# Patient Record
Sex: FEMALE | Employment: OTHER | URBAN - METROPOLITAN AREA
[De-identification: names, ages, dates, MRNs, and addresses within clinical notes are randomized per-mention and may not be internally consistent; named-entity substitution may affect disease eponyms.]

---

## 2018-02-13 ENCOUNTER — OFFICE VISIT (OUTPATIENT)
Dept: INTERNAL MEDICINE CLINIC | Age: 28
End: 2018-02-13

## 2018-02-13 VITALS
HEART RATE: 66 BPM | BODY MASS INDEX: 23.99 KG/M2 | OXYGEN SATURATION: 100 % | HEIGHT: 59 IN | TEMPERATURE: 98.2 F | DIASTOLIC BLOOD PRESSURE: 60 MMHG | RESPIRATION RATE: 18 BRPM | WEIGHT: 119 LBS | SYSTOLIC BLOOD PRESSURE: 95 MMHG

## 2018-02-13 DIAGNOSIS — Z00.00 ROUTINE GENERAL MEDICAL EXAMINATION AT A HEALTH CARE FACILITY: Primary | ICD-10-CM

## 2018-02-13 RX ORDER — DROSPIRENONE AND ETHINYL ESTRADIOL TABLETS 0.02-3(28)
KIT ORAL
Refills: 4 | COMMUNITY
Start: 2017-12-08

## 2018-02-13 NOTE — PATIENT INSTRUCTIONS
[facility]    [phone]        May try OTC zyrtec or allegra or claritin as well as nasal spray - flonase, nasacort

## 2018-02-13 NOTE — PROGRESS NOTES
HISTORY OF PRESENT ILLNESS  Katharina Pineda is a 32 y.o. female. HPI Ms. Frank Rosenberg is here for a general physical exam. She sees gynecology and gets annual exams. Her and her  are considering starting a family next year. She has been bothered by nasal congestion as well as non-specific itching on her chest and arms without a rash. She does not know specific exposures that would be causing the itching. She denies any mouth or throat irritation, swelling. Suggested it may benefit her to see an allergist. I provided her the # to asthma and allergy specialists. Review of Systems   Constitutional: Negative. Respiratory: Negative for cough and shortness of breath. Cardiovascular: Negative for chest pain and leg swelling. Gastrointestinal: Negative. Skin: Positive for itching (since October, occurs once weekly, usually starts on chest and goes to arms, not associated with a rash). Negative for rash. Physical Exam   Constitutional: She is oriented to person, place, and time. She appears well-developed and well-nourished. No distress. HENT:   Head: Normocephalic and atraumatic. Eyes: Conjunctivae are normal.   Neck: No thyromegaly present. Cardiovascular: Normal rate and regular rhythm. No murmur heard. Pulmonary/Chest: Effort normal and breath sounds normal. She has no wheezes. Musculoskeletal: She exhibits no edema. Neurological: She is alert and oriented to person, place, and time. Skin: Skin is warm and dry. No rash noted. Psychiatric: She has a normal mood and affect. Her behavior is normal. Judgment and thought content normal.     Visit Vitals    BP 95/60 (BP 1 Location: Left arm, BP Patient Position: Sitting)    Pulse 66    Temp 98.2 °F (36.8 °C) (Oral)    Resp 18    Ht 4' 11\" (1.499 m)    Wt 119 lb (54 kg)    SpO2 100%    BMI 24.04 kg/m2           ASSESSMENT and PLAN    ICD-10-CM ICD-9-CM    1.  Routine general medical examination at a health care facility Z00.00 V70.0       COLLECTION VENOUS BLOOD,VENIPUNCTURE      METABOLIC PANEL, COMPREHENSIVE      LIPID PANEL      CBC WITH AUTOMATED DIFF      TSH 3RD GENERATION     Pt verbalized understanding of their condition and diagnoses, treatment plan,  as well as side effects of any new medications prescribed.   Patient Instructions   [facility]    [phone]        May try OTC zyrtec or allegra or claritin as well as nasal spray - flonase, nasacort

## 2018-02-13 NOTE — MR AVS SNAPSHOT
[address] [facility] [place] [address] [address] [address] 
[phone] Patient: Domonique Enriquez MRN: [id number] ZQU:[birth year] Visit Information Date & Time Provider Department Dept. Phone Encounter #  
 2/13/2018  8:30 AM Berna Patel PA-C [facility] [phone] [id number] Follow-up Instructions Return in about 1 year (around 2/13/2019). Upcoming Health Maintenance Date Due DTaP/Tdap/Td series (1 - Tdap) 3/1/2011 PAP AKA CERVICAL CYTOLOGY 3/1/2011 Influenza Age 5 to Adult 8/1/2017 Allergies as of 2/13/2018  Review Complete On: 2/13/2018 By: Berna Patel PA-C No Known Allergies Current Immunizations  Never Reviewed No immunizations on file. Not reviewed this visit You Were Diagnosed With   
  
 Codes Comments Routine general medical examination at a health care facility    -  Primary ICD-10-CM: Z00.00 ICD-9-CM: V70.0 Vitals BP Pulse Temp Resp Height(growth percentile) Weight(growth percentile) 95/60 (BP 1 Location: Left arm, BP Patient Position: Sitting) 66 98.2 °F (36.8 °C) (Oral) 18 4' 11\" (1.499 m) 119 lb (54 kg) LMP SpO2 BMI OB Status Smoking Status 02/11/2018 100% 24.04 kg/m2 Having regular periods Never Smoker Vitals History BMI and BSA Data Body Mass Index Body Surface Area 24.04 kg/m 2 1.5 m 2 Preferred Pharmacy Pharmacy Name Phone [facility] [address] [place] [address] [place] [facility] [phone] Your Updated Medication List  
  
   
This list is accurate as of: 2/13/18  9:05 AM.  Always use your most recent med list.  
  
  
  
  
 Nahed Simpson (28) 3-0.02 mg Tab Generic drug:  drospirenone-ethinyl estradiol TAKE 1 TABLET BY MOUTH ONCE A DAY We Performed the Following COLLECTION VENOUS BLOOD,VENIPUNCTURE [id number] CPT(R)] Follow-up Instructions Return in about 1 year (around 2/13/2019). To-Do List   
 02/13/2018 Lab:  CBC WITH AUTOMATED DIFF   
  
 02/13/2018 Lab:  LIPID PANEL   
  
 02/13/2018 Lab:  METABOLIC PANEL, COMPREHENSIVE   
  
 02/13/2018 Lab:  TSH 3RD GENERATION Patient Instructions [facility] [phone] May try OTC zyrtec or allegra or claritin as well as nasal spray - flonase, nasacort Introducing [facility]! Narciso Kat introduces [employer] patient portal. Now you can access parts of your medical record, email your doctor's office, and request medication refills online. 1. In your internet browser, go to [email or web address] 2. Click on the First Time User? Click Here link in the Sign In box. You will see the New Member Sign Up page. 3. Enter your [employer] Access Code exactly as it appears below. You will not need to use this code after youve completed the sign-up process. If you do not sign up before the expiration date, you must request a new code. · [employer] Access Code: [email or web address] Expires: 5/14/2018  9:05 AM 
 
4. Enter the last four digits of your Social Security Number (xxxx) and Date of Birth (mm/dd/yyyy) as indicated and click Submit. You will be taken to the next sign-up page. 5. Create a [employer] ID. This will be your [employer] login ID and cannot be changed, so think of one that is secure and easy to remember. 6. Create a [employer] password. You can change your password at any time. 7. Enter your Password Reset Question and Answer. This can be used at a later time if you forget your password. 8. Enter your e-mail address. You will receive e-mail notification when new information is available in [address] 9. Click Sign Up. You can now view and download portions of your medical record. 10. Click the Download Summary menu link to download a portable copy of your medical information. If you have questions, please visit the Frequently Asked Questions section of the [employer] website. Remember, [email or web address] is NOT to be used for urgent needs. For medical emergencies, dial 911. Now available from your iPhone and Android! Please provide this summary of care documentation to your next provider. If you have any questions after today's visit, please call [phone].

## 2018-02-14 LAB
ALBUMIN SERPL-MCNC: 3.9 G/DL (ref 3.5–5.5)
ALBUMIN/GLOB SERPL: 1.7 {RATIO} (ref 1.2–2.2)
ALP SERPL-CCNC: 51 IU/L (ref 39–117)
ALT SERPL-CCNC: 19 IU/L (ref 0–32)
AST SERPL-CCNC: 16 IU/L (ref 0–40)
BASOPHILS # BLD AUTO: 0 X10E3/UL (ref 0–0.2)
BASOPHILS NFR BLD AUTO: 1 %
BILIRUB SERPL-MCNC: 0.2 MG/DL (ref 0–1.2)
BUN SERPL-MCNC: 11 MG/DL (ref 6–20)
BUN/CREAT SERPL: 19 (ref 9–23)
CALCIUM SERPL-MCNC: 8.7 MG/DL (ref 8.7–10.2)
CHLORIDE SERPL-SCNC: 103 MMOL/L (ref 96–106)
CHOLEST SERPL-MCNC: 177 MG/DL (ref 100–199)
CO2 SERPL-SCNC: 24 MMOL/L (ref 18–29)
CREAT SERPL-MCNC: 0.57 MG/DL (ref 0.57–1)
EOSINOPHIL # BLD AUTO: 0.4 X10E3/UL (ref 0–0.4)
EOSINOPHIL NFR BLD AUTO: 7 %
ERYTHROCYTE [DISTWIDTH] IN BLOOD BY AUTOMATED COUNT: 13.7 % (ref 12.3–15.4)
GFR SERPLBLD CREATININE-BSD FMLA CKD-EPI: 127 ML/MIN/1.73
GFR SERPLBLD CREATININE-BSD FMLA CKD-EPI: 147 ML/MIN/1.73
GLOBULIN SER CALC-MCNC: 2.3 G/DL (ref 1.5–4.5)
GLUCOSE SERPL-MCNC: 84 MG/DL (ref 65–99)
HCT VFR BLD AUTO: 38.1 % (ref 34–46.6)
HDLC SERPL-MCNC: 71 MG/DL
HGB BLD-MCNC: 12.7 G/DL (ref 11.1–15.9)
IMM GRANULOCYTES # BLD: 0 X10E3/UL (ref 0–0.1)
IMM GRANULOCYTES NFR BLD: 0 %
LDLC SERPL CALC-MCNC: 90 MG/DL (ref 0–99)
LYMPHOCYTES # BLD AUTO: 2.2 X10E3/UL (ref 0.7–3.1)
LYMPHOCYTES NFR BLD AUTO: 38 %
MCH RBC QN AUTO: 30.2 PG (ref 26.6–33)
MCHC RBC AUTO-ENTMCNC: 33.3 G/DL (ref 31.5–35.7)
MCV RBC AUTO: 91 FL (ref 79–97)
MONOCYTES # BLD AUTO: 0.4 X10E3/UL (ref 0.1–0.9)
MONOCYTES NFR BLD AUTO: 6 %
NEUTROPHILS # BLD AUTO: 2.9 X10E3/UL (ref 1.4–7)
NEUTROPHILS NFR BLD AUTO: 48 %
PLATELET # BLD AUTO: 339 X10E3/UL (ref 150–379)
POTASSIUM SERPL-SCNC: 4.4 MMOL/L (ref 3.5–5.2)
PROT SERPL-MCNC: 6.2 G/DL (ref 6–8.5)
RBC # BLD AUTO: 4.21 X10E6/UL (ref 3.77–5.28)
SODIUM SERPL-SCNC: 142 MMOL/L (ref 134–144)
TRIGL SERPL-MCNC: 80 MG/DL (ref 0–149)
TSH SERPL DL<=0.005 MIU/L-ACNC: 1.09 UIU/ML (ref 0.45–4.5)
VLDLC SERPL CALC-MCNC: 16 MG/DL (ref 5–40)
WBC # BLD AUTO: 5.9 X10E3/UL (ref 3.4–10.8)

## 2018-04-06 ENCOUNTER — HOSPITAL ENCOUNTER (OUTPATIENT)
Dept: LAB | Age: 28
Discharge: HOME | End: 2018-04-06
Payer: MEDICARE

## 2018-04-06 PROCEDURE — 88175 CYTOPATH C/V AUTO FLUID REDO: CPT | Performed by: OBSTETRICS & GYNECOLOGY

## 2018-04-20 ENCOUNTER — HOSPITAL ENCOUNTER (OUTPATIENT)
Dept: ULTRASOUND IMAGING | Age: 28
Discharge: HOME | End: 2018-04-20
Attending: OBSTETRICS & GYNECOLOGY
Payer: MEDICARE

## 2018-04-20 ENCOUNTER — HOSPITAL ENCOUNTER (OUTPATIENT)
Dept: MAMMOGRAPHY | Age: 28
Discharge: HOME | End: 2018-04-20
Attending: OBSTETRICS & GYNECOLOGY
Payer: MEDICARE

## 2018-04-20 DIAGNOSIS — N63.0 BREAST LUMP: ICD-10-CM

## 2018-04-20 DIAGNOSIS — N63.0 BREAST NODULE: ICD-10-CM

## 2018-04-20 PROCEDURE — 77065 DX MAMMO INCL CAD UNI: CPT

## 2018-04-20 PROCEDURE — 76642 ULTRASOUND BREAST LIMITED: CPT

## 2018-09-10 ENCOUNTER — OFFICE VISIT (OUTPATIENT)
Dept: INTERNAL MEDICINE CLINIC | Age: 28
End: 2018-09-10

## 2018-09-10 VITALS
SYSTOLIC BLOOD PRESSURE: 88 MMHG | BODY MASS INDEX: 24.6 KG/M2 | HEART RATE: 66 BPM | DIASTOLIC BLOOD PRESSURE: 52 MMHG | OXYGEN SATURATION: 99 % | TEMPERATURE: 98.7 F | HEIGHT: 59 IN | RESPIRATION RATE: 18 BRPM | WEIGHT: 122 LBS

## 2018-09-10 DIAGNOSIS — G89.29 CHRONIC BILATERAL LOW BACK PAIN WITHOUT SCIATICA: Primary | ICD-10-CM

## 2018-09-10 DIAGNOSIS — M54.50 CHRONIC BILATERAL LOW BACK PAIN WITHOUT SCIATICA: Primary | ICD-10-CM

## 2018-09-10 RX ORDER — SPIRONOLACTONE 50 MG/1
TABLET, FILM COATED ORAL
Refills: 3 | COMMUNITY
Start: 2018-06-18

## 2018-09-10 NOTE — PROGRESS NOTES
Chief Complaint   Patient presents with    Back Pain     x 5 months ago lower mid back, pt denies any injury      1. Have you been to the ER, urgent care clinic since your last visit? Hospitalized since your last visit? No    2. Have you seen or consulted any other health care providers outside of the [address] [place] [address] since your last visit? Include any pap smears or colon screening.  No

## 2018-09-10 NOTE — PROGRESS NOTES
HISTORY OF PRESENT ILLNESS  Mamta Dale is a 29 y.o. female. HPI Ms. Gertrude Aguila is here for c/o 5-6 months of low back pain. She denies any injury. She denies h/o overuse, sports or accidents. She works as an  out of her house and is standing and bent over a lot. She takes ibuprofen ('s prescription strength) when they pain is more severe and it does subside the pain somewhat, but does not provide total relief. She states the pain used to be intermittent, but now is daily. She states there is always some level of pain, maybe being at a 1-2, but after working, she reports the pain can range from a 6-8. She denies any radiculopathy or leg weakness. Review of Systems   Constitutional: Negative. Respiratory: Negative. Cardiovascular: Negative. Genitourinary: Negative. Musculoskeletal: Positive for back pain. Neurological: Negative for tingling. Physical Exam   Constitutional: She is oriented to person, place, and time. She appears well-developed and well-nourished. No distress. HENT:   Head: Normocephalic and atraumatic. Musculoskeletal:        Lumbar back: She exhibits tenderness and pain. She exhibits normal range of motion. Back:    Neurological: She is alert and oriented to person, place, and time. Psychiatric: She has a normal mood and affect. Her behavior is normal. Judgment and thought content normal.     Visit Vitals    BP (!) 88/52 (BP 1 Location: Left arm, BP Patient Position: Sitting)    Pulse 66    Temp 98.7 °F (37.1 °C) (Oral)    Resp 18    Ht 4' 11\" (1.499 m)    Wt 122 lb (55.3 kg)    SpO2 99%    BMI 24.64 kg/m2   we discussed continuing to take motrin as needed. Discussed muscle relaxer for night time, but she did not want additional medication at this time. She is interested in PT. Will contact pt after final radiology report is available. Diagnoses and all orders for this visit:    1.  Chronic bilateral low back pain without sciatica  -     XR SPINE LUMB 2 OR 3 V; Future  -     REFERRAL TO PHYSICAL THERAPY    Pt verbalized understanding of their condition and diagnoses, treatment plan,  as well as side effects of any new medications prescribed.

## 2018-09-12 ENCOUNTER — TELEPHONE (OUTPATIENT)
Dept: INTERNAL MEDICINE CLINIC | Age: 28
End: 2018-09-12

## 2018-09-26 ENCOUNTER — HOSPITAL ENCOUNTER (OUTPATIENT)
Dept: PHYSICAL THERAPY | Age: 28
Discharge: HOME | End: 2018-09-26
Payer: COMMERCIAL

## 2018-09-26 PROCEDURE — 97530 THERAPEUTIC ACTIVITIES: CPT | Performed by: PHYSICAL THERAPIST

## 2018-09-26 PROCEDURE — 97161 PT EVAL LOW COMPLEX 20 MIN: CPT | Performed by: PHYSICAL THERAPIST

## 2018-09-26 NOTE — PROGRESS NOTES
PHYSICAL THERAPY - DAILY TREATMENT NOTE Patient Name: Sveta Ronnie        Date: 2018 : 1990   YES Patient  Verified Visit #:     Insurance: Payor: Robert Johnson / Plan: [facility] [employer] MEDICARE CHOICE PPO/PFFS / Product Type: Managed Care Medicare / In time: 3:15 Out time: 4:05 Total Treatment Time: 50 Medicare Time Tracking (below) Total Timed Codes (min):  na 1:1 Treatment Time:  na  
 
TREATMENT AREA = Lower back pain [M54.5] SUBJECTIVE Pain Level (on 0 to 10 scale):  1  / 10 Medication Changes/New allergies or changes in medical history, any new surgeries or procedures? NO    If yes, update Summary List  
Subjective Functional Status/Changes:  []  No changes reported See eval/POC OBJECTIVE Modalities Rationale:   PD 
 min [] Estim, type/location:    
                                 []  att     []  unatt     []  w/US     []  w/ice    []  w/heat  
 min []  Mechanical Traction: type/lbs   
               []  pro   []  sup   []  int   []  cont    []  before manual    []  after manual  
 min []  Ultrasound, settings/location:    
 min []  Iontophoresis w/ dexamethasone, location:   
                                           []  take home patch       []  in clinic  
 min []  Ice     []  Heat    location/position:   
 min []  Vasopneumatic Device, press/temp:   
 min []  Other:   
[] Skin assessment post-treatment (if applicable):   
[]  intact    []  redness- no adverse reaction    
[]redness  adverse reaction:     
 
 min Therapeutic Exercise:  []  See flow sheet 10 min Therapeutic Activity: Educated on proper body mechanics   
 
 
 min Neuromuscular Re-ed:   
 
 
 min Gait Training:   
 
 min Patient Education:  Johnnie Sheppard []  Progressed/Changed HEP based on: Other Objective/Functional Measures: FOTO - 79 Post Treatment Pain Level (on 0 to 10) scale:   1  / 10 ASSESSMENT Assessment/Changes in Function: Pt has signs and symptoms suggestive of lumbar sprain due to underlying postural dysfunction. []  See Progress Note/Recertification Patient will continue to benefit from skilled PT services to modify and progress therapeutic interventions, address functional mobility deficits, address ROM deficits, address strength deficits, analyze and address soft tissue restrictions, analyze and cue movement patterns, analyze and modify body mechanics/ergonomics and assess and modify postural abnormalities to attain remaining goals. Progress toward goals / Updated goals: 
 
Goals established today PLAN [x]  Upgrade activities as tolerated YES Continue plan of care  
[]  Discharge due to :   
[]  Other:   
 
Therapist: Evelia Willis PT Date: 9/26/2018 Time: 2:29 PM  
 
Future Appointments Date Time Provider [place] 9/26/2018 3:00 PM Evelia Willis PT [facility]  
[facility]2018 [facility]

## 2018-09-26 NOTE — PROGRESS NOTES
[address] [facility]  [facility] 
Joe Kulkarni [facility] [address], [address], [address]  Phone: [phone]  Fax: [phone] PLAN OF CARE / STATEMENT OF MEDICAL NECESSITY FOR PHYSICAL THERAPY SERVICES Patient Name: Nanette Cruz : 1990 Medical  
Diagnosis: Lower back pain [M54.5] Treatment Diagnosis: LBP Onset Date: 2018 Referral Source: Hansa Brambila Start of Care [facility]): 2018 Prior Hospitalization: See medical history Provider #: [id number] Prior Level of Function: Unlimited sitting and standing tolerance Comorbidities: none Medications: Verified on Patient Summary List  
The Plan of Care and following information is based on the information from the initial evaluation.  
=========================================================================================== Assessment / key information:  28 y/o female present sot PT with c/o central lower back, which has been worsening over the past 6 months. She notes increasing her work (sitting as a ) increased twofold in March. Her pain is when she gets OOB in the morning and when she first gets up from a chair. Pt has difficulty finding a support sitting environment due to her height (4'10\"). Examination today reveals pt has a leg length discrepancy (R>L). Her spinal AROM is full without any pain, only some end range stretching. LE strength is WFL and painfree. Her symptoms were reproduced with slumped sitting, and relieved with postural correction. Palpation revealed increase muscle tension in right lumbar spine and hip flexibility revealed hamstring tightness bilaterally. Pt has signs and symptoms suggestive of lumbar sprain due to underlying postural stress. 
=========================================================================================== Eval Complexity: History LOW Complexity : Zero comorbidities / personal factors that will impact the outcome / POC;  Examination  MEDIUM Complexity : 3 Standardized tests and measures addressing body structure, function, activity limitation and / or participation in recreation ; Presentation MEDIUM Complexity : Evolving with changing characteristics ; Decision Making MEDIUM Complexity : FOTO score of 26-74; Overall Complexity LOW Problem List: pain affecting function, decrease ROM, decrease strength, decrease ADL/ functional abilitiies, decrease activity tolerance, decrease flexibility/ joint mobility and decrease transfer abilities Treatment Plan may include any combination of the following: Therapeutic exercise, Therapeutic activities, Neuromuscular re-education, Physical agent/modality, Gait/balance training, Manual therapy, Dry Needling, Aquatic therapy, Patient education, Self Care training, Functional mobility training, Home safety training and Stair training Patient / Family readiness to learn indicated by: asking questions, trying to perform skills and interestPersons(s) to be included in education: patient (P) Barriers to Learning/Limitations: None Measures taken:   
Patient Goal (s): \"better position, learn movements to make my back better\" Patient self reported health status: good Rehabilitation Potential: good? Short Term Goals: To be accomplished in  3  weeks: 1. Pt independent with postural correction strategies at home to allow her to stand form sitting without lower back pain. 2. Pt able to get OOB in the morning without lower back pain 5/7 days. ? Long Term Goals: To be accomplished in  6  weeks: 1. Pt to increase FOTO score from 67 to >/= 76. 
2. Pt independent with high level HEP for hip flexibility and lumbar stabilization exercises. 3. Pt able to go a full week at work wihtout any lower back pain.  
Frequency / Duration:   Patient to be seen  1-2  times per week for 6  weeks: 
Patient / Caregiver education and instruction: self care, activity modification and exercises G-Codes (GP): tony Therapist Signature: Denice Rowe PT Date: 9/26/2018 Certification Period: na Time: 2:30 PM  
=========================================================================================== I certify that the above Physical Therapy Services are being furnished while the patient is under my care. I agree with the treatment plan and certify that this therapy is necessary. Physician Signature:       Date:      Time:  Please sign and return to [facility] or you may fax the signed copy to [phone]. Thank you.

## 2018-10-04 ENCOUNTER — HOSPITAL ENCOUNTER (OUTPATIENT)
Dept: PHYSICAL THERAPY | Age: 28
Discharge: HOME | End: 2018-10-04
Payer: COMMERCIAL

## 2018-10-04 PROCEDURE — 97110 THERAPEUTIC EXERCISES: CPT

## 2018-10-11 ENCOUNTER — HOSPITAL ENCOUNTER (OUTPATIENT)
Dept: PHYSICAL THERAPY | Age: 28
Discharge: HOME | End: 2018-10-11
Payer: COMMERCIAL

## 2018-10-11 PROCEDURE — 97110 THERAPEUTIC EXERCISES: CPT | Performed by: PHYSICAL THERAPIST

## 2018-10-11 NOTE — PROGRESS NOTES
PHYSICAL THERAPY - DAILY TREATMENT NOTE Patient Name: Kalina Castrejon        Date: 10/11/2018 : 1990   YES Patient  Verified Visit #:   3   of   12  Insurance: Payor: Aldo Raquel / Plan: Krzysztof Painter / Product Type: Commerical / In time: 3:05 Out time: 4:00 Total Treatment Time: 54 Medicare Time Tracking (below) Total Timed Codes (min):  na 1:1 Treatment Time:  na  
TREATMENT AREA = Lower back pain [M54.5] SUBJECTIVE Pain Level (on 0 to 10 scale):  1  / 10 Medication Changes/New allergies or changes in medical history, any new surgeries or procedures? NO    If yes, update Summary List  
Subjective Functional Status/Changes:  []  No changes reported Pt reports still having pain at end of workday. She has tried to change her sitting, but still has pain by end of day. OBJECTIVE Modalities Rationale:     decrease pain to improve patient's ability to allow painfree sitting 
 min [] Estim, type/location:   
                                 []  att     []  unatt     []  w/US     []  w/ice    []  w/heat 
 min []  Mechanical Traction: type/lbs   
               []  pro   []  sup   []  int   []  cont    []  before manual    []  after manual  
 min []  Ultrasound, settings/location:    
 min []  Iontophoresis w/ dexamethasone, location:   
                                           []  take home patch       []  in clinic  
10 min []  Ice     [x]  Heat    location/position: Low Back - supine after treatment  
 min []  Vasopneumatic Device, press/temp:   
 min []  Other:   
[] Skin assessment post-treatment (if applicable):   
[]  intact    []  redness- no adverse reaction    
[]redness  adverse reaction:     
45 min Therapeutic Exercise:  [x]  See flow sheet Rationale:      increase ROM, increase strength and improve coordination to improve the patients ability to increase positional tolerance  
 min Therapeutic Activity:   
 
 min Neuromuscular Re-ed: min Gait Training:   
 min Patient Education:  Papi Brett []  Progressed/Changed HEP based on: Other Objective/Functional Measures: 
 
Pt able to demonstrate her work environment in clinic today, and she noted that our stools are stefani to go much lower than hers at home. Post Treatment Pain Level (on 0 to 10) scale:   0  / 10 ASSESSMENT Assessment/Changes in Function: Pt will trial a lower stool at home to see if she can improve her sitting posture with prolonged sitting at work. []  See Progress Note/Recertification Patient will continue to benefit from skilled PT services to modify and progress therapeutic interventions, address functional mobility deficits, address ROM deficits, address strength deficits, analyze and address soft tissue restrictions, analyze and cue movement patterns, analyze and modify body mechanics/ergonomics and assess and modify postural abnormalities to attain remaining goals. Progress toward goals / Updated goals: Will continue with extension biased exercise progression PLAN [x]  Upgrade activities as tolerated YES Continue plan of care  
[]  Discharge due to :   
[]  Other:   
 
Therapist: Rosie Chandler, PT Date: 10/11/2018 Time: 9:38 AM  
 
Future Appointments Date Time Provider [place] 10/11/2018 3:00 PM Rosie Chandler, PT [facility]  
10/17/2018 6:00 PM Rosie Chandler, PT [facility]  
10/22/2018 9:30 AM [facility]  
10/24/2018 5:00 PM Rosie Chandler, PT [facility]  
10/30/2018 4:00 PM Darek Espana, PT [facility]

## 2018-10-17 ENCOUNTER — HOSPITAL ENCOUNTER (OUTPATIENT)
Dept: PHYSICAL THERAPY | Age: 28
Discharge: HOME | End: 2018-10-17
Payer: COMMERCIAL

## 2018-10-17 PROCEDURE — 97110 THERAPEUTIC EXERCISES: CPT | Performed by: PHYSICAL THERAPIST

## 2018-10-17 NOTE — PROGRESS NOTES
PHYSICAL THERAPY - DAILY TREATMENT NOTE Patient Name: Dena Wiggins        Date: 10/17/2018 : 1990   YES Patient  Verified Visit #:   4   of   12  Insurance: Payor: Justin Mello / Plan: Tierra Naqvi / Product Type: Commerical / In time: 6:05 Out time: 6:55 Total Treatment Time: 50 Medicare Time Tracking (below) Total Timed Codes (min):  40 1:1 Treatment Time:  40 TREATMENT AREA = Lower back pain [M54.5] SUBJECTIVE Pain Level (on 0 to 10 scale):  0  / 10 Medication Changes/New allergies or changes in medical history, any new surgeries or procedures? NO    If yes, update Summary List  
Subjective Functional Status/Changes:  []  No changes reported Pt reports having less pain when suing the shorter treatment stool while working the past few days. OBJECTIVE Modalities Rationale:     decrease pain and increase tissue extensibility to improve patient's ability to prevent soreness 
 min [] Estim, type/location:   
                                 []  att     []  unatt     []  w/US     []  w/ice    []  w/heat 
 min []  Mechanical Traction: type/lbs   
               []  pro   []  sup   []  int   []  cont    []  before manual    []  after manual  
 min []  Ultrasound, settings/location:    
 min []  Iontophoresis w/ dexamethasone, location:   
                                           []  take home patch       []  in clinic  
10 min []  Ice     [x]  Heat    location/position: Low Back - supine after treatment  
 min []  Vasopneumatic Device, press/temp:   
 min []  Other:   
[] Skin assessment post-treatment (if applicable):   
[]  intact    []  redness- no adverse reaction    
[]redness  adverse reaction:     
40 min Therapeutic Exercise:  [x]  See flow sheet Rationale:      increase ROM, increase strength and improve balance to improve the patients ability to increase positional tolerance  
 min Therapeutic Activity:   
 
 min Neuromuscular Re-ed: min Gait Training:   
 min Patient Education:  Florinda Paul []  Progressed/Changed HEP based on: Other Objective/Functional Measures: Added quadruped UE raise and prone alternating SLR Post Treatment Pain Level (on 0 to 10) scale:   0  / 10 ASSESSMENT Assessment/Changes in Function:  
 
Pt encouraged to continue using shorter stool and she was given information on ordering a shorter stool. []  See Progress Note/Recertification Patient will continue to benefit from skilled PT services to modify and progress therapeutic interventions, address functional mobility deficits, address ROM deficits, address strength deficits, analyze and address soft tissue restrictions, analyze and cue movement patterns, analyze and modify body mechanics/ergonomics and assess and modify postural abnormalities to attain remaining goals. Progress toward goals / Updated goals: Will continue to progress therex for extension biased ROM and strengthening/endurance. PLAN [x]  Upgrade activities as tolerated YES Continue plan of care  
[]  Discharge due to :   
[]  Other:   
 
Therapist: Gurinder Cabral PT Date: 10/17/2018 Time: 10:42 AM  
 
Future Appointments Date Time Provider [place] 10/17/2018  6:00 PM [address]  
10/22/2018  9:30 AM [address] [place] [address] [facility] RM 1 [facility] [address] [place] [address]  
10/24/2018  5:00 PM [address]  
10/30/2018  4:00 PM Chayo Dooley, PT [address]

## 2018-10-22 ENCOUNTER — HOSPITAL ENCOUNTER (OUTPATIENT)
Dept: ULTRASOUND IMAGING | Age: 28
Discharge: HOME | End: 2018-10-22
Attending: OBSTETRICS & GYNECOLOGY
Payer: COMMERCIAL

## 2018-10-22 DIAGNOSIS — N63.0 BREAST MASS: ICD-10-CM

## 2018-10-22 PROCEDURE — 76642 ULTRASOUND BREAST LIMITED: CPT

## 2018-10-24 ENCOUNTER — HOSPITAL ENCOUNTER (OUTPATIENT)
Dept: PHYSICAL THERAPY | Age: 28
Discharge: HOME | End: 2018-10-24
Payer: COMMERCIAL

## 2018-10-24 PROCEDURE — 97110 THERAPEUTIC EXERCISES: CPT | Performed by: PHYSICAL THERAPIST

## 2018-10-24 NOTE — PROGRESS NOTES
PHYSICAL THERAPY - DAILY TREATMENT NOTE Patient Name: Rylan Vasquez        Date: 10/24/2018 : 1990   YES Patient  Verified Visit #:      of   12  Insurance: Payor: Elise Washington / Plan: [facility] HUMAN MEDICARE CHOICE PPO/PFFS / Product Type: Managed Care Medicare / In time: 5:05 Out time: 5:55 Total Treatment Time: 50 Medicare Time Tracking (below) Total Timed Codes (min):  na 1:1 Treatment Time:  na  
TREATMENT AREA = Lower back pain [M54.5] SUBJECTIVE Pain Level (on 0 to 10 scale):  0  / 10 Medication Changes/New allergies or changes in medical history, any new surgeries or procedures? NO    If yes, update Summary List  
Subjective Functional Status/Changes:  []  No changes reported Pt reports her lower back has been much better since using a shorter stool while working. OBJECTIVE Modalities Rationale:     decrease pain and increase tissue extensibility to improve patient's ability to increase positional tolerance 
 min [] Estim, type/location:   
                                 []  att     []  unatt     []  w/US     []  w/ice    []  w/heat 
 min []  Mechanical Traction: type/lbs   
               []  pro   []  sup   []  int   []  cont    []  before manual    []  after manual  
 min []  Ultrasound, settings/location:    
 min []  Iontophoresis w/ dexamethasone, location:   
                                           []  take home patch       []  in clinic  
10 min []  Ice     [x]  Heat    location/position: Low Back - supine after treatment  
 min []  Vasopneumatic Device, press/temp:   
 min []  Other:   
[] Skin assessment post-treatment (if applicable):   
[]  intact    []  redness- no adverse reaction    
[]redness  adverse reaction:     
40 min Therapeutic Exercise:  [x]  See flow sheet Rationale:      increase ROM, increase strength and improve coordination to improve the patients ability to increase positional tolerance  
 min Therapeutic Activity: min Neuromuscular Re-ed:   
 
 min Gait Training:   
 min Patient Education:  Louie Spatz []  Progressed/Changed HEP based on: Other Objective/Functional Measures: Added hip hinge with dowel to promote proper bending mechanics Added prone press ups today Post Treatment Pain Level (on 0 to 10) scale:   0  / 10 ASSESSMENT Assessment/Changes in Function:  
 
Pt had notable stiffness with prone press ups and was encouraged to perform at home.   
[]  See Progress Note/Recertification Patient will continue to benefit from skilled PT services to modify and progress therapeutic interventions, address functional mobility deficits, address ROM deficits, address strength deficits, analyze and address soft tissue restrictions, analyze and cue movement patterns, analyze and modify body mechanics/ergonomics and assess and modify postural abnormalities to attain remaining goals. Progress toward goals / Updated goals: Will reassess next session for possible DC if symptom remains controlled. PLAN [x]  Upgrade activities as tolerated YES Continue plan of care  
[]  Discharge due to :   
[]  Other:   
 
Therapist: Ester Villanueva PT Date: 10/24/2018 Time: 10:49 AM  
 
Future Appointments Date Time Provider [place] 10/24/2018  5:00 PM Salud Stevenson, PT [facility] [place] [facility]  
10/30/2018  4:00 PM Dipika Dooley, PT [facility]

## 2018-10-30 ENCOUNTER — APPOINTMENT (OUTPATIENT)
Dept: PHYSICAL THERAPY | Age: 28
End: 2018-10-30
Payer: COMMERCIAL

## 2018-11-19 NOTE — PROGRESS NOTES
[address] [facility] PHYSICAL THERAPY AT [address] [place] [address] 
Joe Monteiros [address], [address] [place] [id number], [address]  Phone: [phone]  Fax: [phone] DISCHARGE SUMMARY Patient Name: Hector Shaikh : 1990 Treatment/Medical Diagnosis: Low back pain [M54.5] Referral Source: Cecily Caldera Mail Date of Initial Visit: 18 Attended Visits: 5 Missed Visits: 0  
SUMMARY OF TREATMENT Lumbar extension biased stretching, hip flexibility, cores strengthening and moist heat for symptom relief. CURRENT STATUS Pt was last seen on 10/24/18, and reported 0/10 pain. She has been doing her HEP and has emphasized changing the ergonomics of her work situation, most notably using a shorter stool to allow a better posture. A follow-up phone call on 18 noted that the pt was doing doing \"Great\" and wished to be discharged from PT. Goal/Measure of Progress Goal Met? 1.  Pt able to go a full week at work wihtout any lower back pain. Status at last Eval: - Current Status: Met yes 2. Pt independent with high level HEP for hip flexibility and lumbar stabilization exercises. Status at last Eval: - Current Status: Met yes 3. Pt able to get OOB in the morning without lower back pain 5/7 days. Status at last Eval: - Current Status: Met yes RECOMMENDATIONS Discontinue therapy. Progressing towards or have reached established goals. If you have any questions/comments please contact us directly at [phone]. Thank you for allowing us to assist in the care of your patient. Therapist Signature: Carole Moon, PT Date: 18   Time: 10:57 AM

## 2019-03-25 ENCOUNTER — HOSPITAL ENCOUNTER (OUTPATIENT)
Dept: LAB | Age: 29
Discharge: HOME | End: 2019-03-25
Payer: COMMERCIAL

## 2019-03-25 PROCEDURE — 88175 CYTOPATH C/V AUTO FLUID REDO: CPT

## 2019-04-29 ENCOUNTER — HOSPITAL ENCOUNTER (OUTPATIENT)
Dept: ULTRASOUND IMAGING | Age: 29
Discharge: HOME | End: 2019-04-29
Attending: OBSTETRICS & GYNECOLOGY
Payer: MEDICARE

## 2019-04-29 DIAGNOSIS — N63.10 UNSPECIFIED LUMP IN THE RIGHT BREAST, UNSPECIFIED QUADRANT: ICD-10-CM

## 2019-04-29 PROCEDURE — 76642 ULTRASOUND BREAST LIMITED: CPT

## 2019-12-11 ENCOUNTER — HOSPITAL ENCOUNTER (OUTPATIENT)
Dept: ULTRASOUND IMAGING | Age: 29
Discharge: HOME | End: 2019-12-11
Attending: OBSTETRICS & GYNECOLOGY
Payer: COMMERCIAL

## 2019-12-11 DIAGNOSIS — N63.0 LUMP OR MASS IN BREAST: ICD-10-CM

## 2019-12-11 PROCEDURE — 76642 ULTRASOUND BREAST LIMITED: CPT

## 2020-05-04 ENCOUNTER — HOSPITAL ENCOUNTER (OUTPATIENT)
Dept: LAB | Age: 30
Discharge: HOME | End: 2020-05-04
Payer: MEDICARE

## 2020-05-04 PROCEDURE — 88175 CYTOPATH C/V AUTO FLUID REDO: CPT

## 2020-05-04 PROCEDURE — 87624 HPV HI-RISK TYP POOLED RSLT: CPT

## 2024-08-27 NOTE — PROGRESS NOTES
Last refill 8/2/24 for 1 month supply  Patient coming in for follow up visit on 9/19/24  Refill sent    Encounter Closed     PHYSICAL THERAPY - DAILY TREATMENT NOTE Patient Name: Choco Moore        Date: 10/4/2018 : 1990   YES Patient  Verified Visit #:      of   12  Insurance: Payor: Reji Garcia / Plan: José Luis Riley / Product Type: Commerical / In time: 3:35 P Out time: 4:35 P Total Treatment Time: 54 Medicare Time Tracking (below) Total Timed Codes (min):  NA 1:1 Treatment Time:  NA  
 
TREATMENT AREA =  Lower back pain [M54.5] SUBJECTIVE Pain Level (on 0 to 10 scale):  1  / 10 Medication Changes/New allergies or changes in medical history, any new surgeries or procedures? NO    If yes, update Summary List  
Subjective Functional Status/Changes:  []  No changes reported Patient reports she is trying to watch her posture during her work day. OBJECTIVE Modalities Rationale:     decrease pain to improve patient's ability to return to pain-free sitting at work  
 min [] Estim, type/location:    
                                 []  att     []  unatt     []  w/US     []  w/ice    []  w/heat  
 min []  Mechanical Traction: type/lbs   
               []  pro   []  sup   []  int   []  cont    []  before manual    []  after manual  
 min []  Ultrasound, settings/location:    
 min []  Iontophoresis w/ dexamethasone, location:   
                                           []  take home patch       []  in clinic  
10 min []  Ice     [x]  Heat    location/position: Supine to l/s   
 min []  Vasopneumatic Device, press/temp:   
 min []  Other:   
[] Skin assessment post-treatment (if applicable):   
[]  intact    []  redness- no adverse reaction    
[]redness  adverse reaction:     
 
45 min Therapeutic Exercise:  [x]  See flow sheet Rationale:      increase ROM and increase strength to improve the patients ability to return to pain-free sitting  
 
 min Manual Therapy:   
Rationale:      
 
 min Therapeutic Activity:   
Rationale:     
 
 min Neuromuscular Re-ed: Rationale:   
 
 min Gait Training:   
Rationale:     
 min Patient Education:  Stanley Dubose []  Progressed/Changed HEP based on: Other Objective/Functional Measures: 
 
Initiated TE per flow sheet Post Treatment Pain Level (on 0 to 10) scale:   1  / 10 ASSESSMENT Assessment/Changes in Function: Attempted REIL with reproduction of LBP, improved tolerance to KATHY & ANEESH, otherwise no increase in pain today 
  
[]  See Progress Note/Recertification Patient will continue to benefit from skilled PT services to modify and progress therapeutic interventions, address functional mobility deficits, address ROM deficits, address strength deficits and instruct in home and community integration to attain remaining goals. Progress toward goals / Updated goals: 
 
Progressing towards goals established at [address] [facility] PLAN [x]  Upgrade activities as tolerated YES Continue plan of care  
[]  Discharge due to :   
[]  Other:   
 
Therapist: Brenda Cooper, PT Date: 10/4/2018 Time: 6:54 PM  
 
Future Appointments Date Time Provider [place] 10/11/2018 3:00 PM Stafford Runner, PT [facility]  
10/17/2018 6:00 PM Stafford Runner, PT [facility]  
10/19/2018 8:30 AM [facility] US RM 1 [facility]  
10/24/2018 5:00 PM Stafford Runner, PT [facility]  
10/30/2018 4:00 PM Brenda Cooper PT [facility]